# Patient Record
Sex: MALE | Race: WHITE | HISPANIC OR LATINO | Employment: FULL TIME | URBAN - METROPOLITAN AREA
[De-identification: names, ages, dates, MRNs, and addresses within clinical notes are randomized per-mention and may not be internally consistent; named-entity substitution may affect disease eponyms.]

---

## 2017-01-23 ENCOUNTER — NON-PROVIDER VISIT (OUTPATIENT)
Dept: URGENT CARE | Facility: PHYSICIAN GROUP | Age: 47
End: 2017-01-23

## 2017-01-23 DIAGNOSIS — Z11.1 SPECIAL SCREENING EXAMINATION FOR RESPIRATORY TUBERCULOSIS: ICD-10-CM

## 2017-01-23 PROCEDURE — 86580 TB INTRADERMAL TEST: CPT | Performed by: PHYSICIAN ASSISTANT

## 2017-01-25 ENCOUNTER — NON-PROVIDER VISIT (OUTPATIENT)
Dept: URGENT CARE | Facility: PHYSICIAN GROUP | Age: 47
End: 2017-01-25

## 2017-01-25 DIAGNOSIS — Z11.1 ENCOUNTER FOR PPD SKIN TEST READING: ICD-10-CM

## 2017-01-25 LAB — TB WHEAL 3D P 5 TU DIAM: NORMAL MM

## 2017-01-25 NOTE — MR AVS SNAPSHOT
Ridge PfeifferIzuiriya   2017 1:05 PM   Non-Provider Visit   MRN: [id number]    Department:  [facility]   Dept Phone:  [phone]    Description:  Male : 1970   Provider:  [facility]           Reason for Visit     PPD Reading 1 of 1      Allergies as of 2017     No Known Allergies      You were diagnosed with     Encounter for PPD skin test reading   [[id number]]         Vital Signs     Smoking Status                   Current Every Day Smoker           Basic Information     Date Of Birth Sex Race Ethnicity Preferred Language    1970 Male  or   Origin (Trinidadian,Danish,Turkmen,Dominican, etc) English      Problem List              ICD-10-CM Priority Class Noted - Resolved    Verrucous skin lesion B07.9   2014 - Present    Insomnia G47.00   2015 - Present      Health Maintenance        Date Due Completion Dates    IMM DTaP/Tdap/Td Vaccine (1 - Tdap) 10/23/1989 ---    IMM INFLUENZA (1) 2016 ---            Current Immunizations     Tuberculin Skin Test 2017      Below and/or attached are the medications your provider expects you to take. Review all of your home medications and newly ordered medications with your provider and/or pharmacist. Follow medication instructions as directed by your provider and/or pharmacist. Please keep your medication list with you and share with your provider. Update the information when medications are discontinued, doses are changed, or new medications (including over-the-counter products) are added; and carry medication information at all times in the event of emergency situations     Allergies:  No Known Allergies          Medications  Valid as of: 2017 -  1:27 PM    Generic Name Brand Name Tablet Size Instructions for use    Fluticasone Propionate (Suspension) FLONASE 50 MCG/ACT Spray 1 Spray in nose every day.        Zolpidem Tartrate (Tab) AMBIEN 10 MG Take 1 Tab by mouth at bedtime  as needed for Sleep.        .                 Medicines prescribed today were sent to:     [id number] - [place], [place] - [address] [facility]    [address] [facility] [place] [address]    Phone: [phone] Fax: [phone]    Open 24 Hours?: No      Medication refill instructions:       If your prescription bottle indicates you have medication refills left, it is not necessary to call your provider’s office. Please contact your pharmacy and they will refill your medication.    If your prescription bottle indicates you do not have any refills left, you may request refills at any time through one of the following ways: The online [employer] system (except Urgent Care), by calling your provider’s office, or by asking your pharmacy to contact your provider’s office with a refill request. Medication refills are processed only during regular business hours and may not be available until the next business day. Your provider may request additional information or to have a follow-up visit with you prior to refilling your medication.   *Please Note: Medication refills are assigned a new Rx number when refilled electronically. Your pharmacy may indicate that no refills were authorized even though a new prescription for the same medication is available at the pharmacy. Please request the medicine by name with the pharmacy before contacting your provider for a refill.           [employer] Access Code: [email or web address]  Expires: 2/22/2017 11:22 AM    [employer]  A secure, online tool to manage your health information     [employer]® is a secure, online tool that connects you to your personalized health information from the privacy of your home -- day or night - making it very easy for you to manage your healthcare. Once the activation process is completed, you can even access your medical information using the Be [employer] yanira, which is available for free in the Apple Yanira store or Google Play store.     [employer] provides  the following levels of access (as shown below):   My Chart Features   [facility] Doctor [facility]  Specialists [facility]  Urgent  Care [facility]  Primary Care  Doctor   Email your healthcare team securely and privately 24/7 X X X    Manage appointments: schedule your next appointment; view details of past/upcoming appointments X      Request prescription refills. X      View recent personal medical records, including lab and immunizations X X X X   View health record, including health history, allergies, medications X X X X   Read reports about your outpatient visits, procedures, consult and ER notes X X X X   See your discharge summary, which is a recap of your hospital and/or ER visit that includes your diagnosis, lab results, and care plan. X X       How to register for [employer]:  1. Go to  [email or web address].  2. Click on the Sign Up Now box, which takes you to the New Member Sign Up page. You will need to provide the following information:  a. Enter your [employer] Access Code exactly as it appears at the top of this page. (You will not need to use this code after you’ve completed the sign-up process. If you do not sign up before the expiration date, you must request a new code.)   b. Enter your date of birth.   c. Enter your home email address.   d. Click Submit, and follow the next screen’s instructions.  3. Create a [employer] ID. This will be your [employer] login ID and cannot be changed, so think of one that is secure and easy to remember.  4. Create a [employer] password. You can change your password at any time.  5. Enter your Password Reset Question and Answer. This can be used at a later time if you forget your password.   6. Enter your e-mail address. This allows you to receive e-mail notifications when new information is available in [employer].  7. Click Sign Up. You can now view your health information.    For assistance activating your [employer] account, call [phone]

## 2017-01-25 NOTE — PROGRESS NOTES
Ridge Candice is a 46 y.o. male here for a non-provider visit for PPD reading -- Step 1 of 1.      Resulted in Epic under original non-provider visit? No   TB evaluation questionnaire scanned into chart and original given to pt?Yes            Routed to PCP? No

## 2017-02-03 ENCOUNTER — TELEPHONE (OUTPATIENT)
Dept: URGENT CARE | Facility: PHYSICIAN GROUP | Age: 47
End: 2017-02-03

## 2017-02-03 NOTE — TELEPHONE ENCOUNTER
1. Caller Name: Ridge Harvey                                         Call Back Number: [phone] (home)       Patient approves a detailed voicemail message: yes    Pt called asking if we could fax his recent PPD results to .  I faxed it and went thru as complete.

## 2017-08-07 ENCOUNTER — HOSPITAL ENCOUNTER (OUTPATIENT)
Dept: RADIOLOGY | Facility: MEDICAL CENTER | Age: 47
End: 2017-08-07
Attending: OTOLARYNGOLOGY
Payer: COMMERCIAL

## 2017-08-07 DIAGNOSIS — R13.10 DYSPHAGIA, UNSPECIFIED TYPE: ICD-10-CM

## 2017-08-07 PROCEDURE — 700112 HCHG RX REV CODE 229: Performed by: OTOLARYNGOLOGY

## 2017-08-07 PROCEDURE — A9270 NON-COVERED ITEM OR SERVICE: HCPCS | Performed by: OTOLARYNGOLOGY

## 2017-08-07 PROCEDURE — 74220 X-RAY XM ESOPHAGUS 1CNTRST: CPT

## 2017-08-07 RX ADMIN — ANTACID/ANTIFLATULENT 1 PACKET: 380; 550; 10; 10 GRANULE, EFFERVESCENT ORAL at 13:10

## 2020-05-22 ENCOUNTER — HOSPITAL ENCOUNTER (EMERGENCY)
Facility: MEDICAL CENTER | Age: 50
End: 2020-05-22
Attending: EMERGENCY MEDICINE
Payer: COMMERCIAL

## 2020-05-22 ENCOUNTER — APPOINTMENT (OUTPATIENT)
Dept: RADIOLOGY | Facility: MEDICAL CENTER | Age: 50
End: 2020-05-22
Attending: EMERGENCY MEDICINE
Payer: COMMERCIAL

## 2020-05-22 VITALS
SYSTOLIC BLOOD PRESSURE: 150 MMHG | BODY MASS INDEX: 28.79 KG/M2 | OXYGEN SATURATION: 96 % | RESPIRATION RATE: 20 BRPM | WEIGHT: 190 LBS | HEART RATE: 81 BPM | HEIGHT: 68 IN | DIASTOLIC BLOOD PRESSURE: 113 MMHG | TEMPERATURE: 100.1 F

## 2020-05-22 DIAGNOSIS — J02.9 SORE THROAT: ICD-10-CM

## 2020-05-22 DIAGNOSIS — J06.9 UPPER RESPIRATORY TRACT INFECTION, UNSPECIFIED TYPE: ICD-10-CM

## 2020-05-22 DIAGNOSIS — Z20.822 SUSPECTED COVID-19 VIRUS INFECTION: ICD-10-CM

## 2020-05-22 DIAGNOSIS — R05.9 COUGH: ICD-10-CM

## 2020-05-22 DIAGNOSIS — R03.0 ELEVATED BLOOD PRESSURE READING: ICD-10-CM

## 2020-05-22 LAB
COVID ORDER STATUS COVID19: NORMAL
S PYO DNA SPEC NAA+PROBE: NOT DETECTED

## 2020-05-22 PROCEDURE — C9803 HOPD COVID-19 SPEC COLLECT: HCPCS | Performed by: EMERGENCY MEDICINE

## 2020-05-22 PROCEDURE — A9270 NON-COVERED ITEM OR SERVICE: HCPCS | Performed by: EMERGENCY MEDICINE

## 2020-05-22 PROCEDURE — 71045 X-RAY EXAM CHEST 1 VIEW: CPT

## 2020-05-22 PROCEDURE — 700101 HCHG RX REV CODE 250

## 2020-05-22 PROCEDURE — 99284 EMERGENCY DEPT VISIT MOD MDM: CPT

## 2020-05-22 PROCEDURE — 87651 STREP A DNA AMP PROBE: CPT

## 2020-05-22 PROCEDURE — 700102 HCHG RX REV CODE 250 W/ 637 OVERRIDE(OP): Performed by: EMERGENCY MEDICINE

## 2020-05-22 RX ORDER — ACETAMINOPHEN 500 MG
1000 TABLET ORAL ONCE
Status: DISCONTINUED | OUTPATIENT
Start: 2020-05-22 | End: 2020-05-22

## 2020-05-22 RX ORDER — PROPARACAINE HYDROCHLORIDE 5 MG/ML
SOLUTION/ DROPS OPHTHALMIC
Status: COMPLETED
Start: 2020-05-22 | End: 2020-05-22

## 2020-05-22 RX ORDER — TROPICAMIDE 10 MG/ML
SOLUTION/ DROPS OPHTHALMIC
Status: DISCONTINUED
Start: 2020-05-22 | End: 2020-05-22 | Stop reason: HOSPADM

## 2020-05-22 RX ORDER — ACETAMINOPHEN 325 MG/1
975 TABLET ORAL ONCE
Status: COMPLETED | OUTPATIENT
Start: 2020-05-22 | End: 2020-05-22

## 2020-05-22 RX ADMIN — FLUORESCEIN SODIUM: 1 STRIP OPHTHALMIC at 18:33

## 2020-05-22 RX ADMIN — PROPARACAINE HYDROCHLORIDE 1 DROP: 5 SOLUTION/ DROPS OPHTHALMIC at 18:30

## 2020-05-22 RX ADMIN — ACETAMINOPHEN 975 MG: 325 TABLET, FILM COATED ORAL at 19:04

## 2020-05-22 RX ADMIN — FLUORESCEIN SODIUM: 1 STRIP OPHTHALMIC at 18:32

## 2020-05-22 ASSESSMENT — LIFESTYLE VARIABLES: DO YOU DRINK ALCOHOL: YES

## 2020-05-23 NOTE — DISCHARGE INSTRUCTIONS
You were seen and evaluated in the Emergency Department at [facility] for:     Cough and sore throat and recent contact with someone with coronavirus    You had the following tests and studies:    Thankfully your strep test is negative and your chest x-ray is clear.  Your coronavirus test is pending, but you should act like you have it.  You do have symptoms consistent with it, but thankfully without any pneumonia on your chest x-ray and your oxygen levels are stable/normal you are safe to go home.  Please isolate and wear a mask if you are ever out in public and assume that you do have it until we get test results back.    You received the following medications:    Acetaminophen for aches and pains and fever.    You received the following prescriptions:    Take acetaminophen/Tylenol 1000 mg up to 3 times per day for the next 3 days.  ----------------------------    Please make sure to follow up with:    Your primary care provider, your blood pressure was elevated today, but if you have any new or worsening symptoms particularly trouble breathing or feeling like you might pass out or any other concerns return to the ER immediately.    Good luck, we hope you get better soon!  ----------------------------    We always encourage patients to return IMMEDIATELY if they have:  Increased or changing pain, passing out, fevers over 100.4 (taken in your mouth or rectally) for more than 2 days, redness or swelling of skin or tissues, feeling like your heart is beating fast, chest pain that is new or worsening, trouble breathing, feeling like your throat is closing up and can not breath, inability to walk, weakness of any part of your body, new dizziness, severe bleeding that won't stop from any part of your body, if you can't eat or drink, or if you have any other concerns.   If you feel worse, please know that you can always return with any questions, concerns, worse symptoms, or you are feeling unsafe. We  certainly cannot say for sure that we have ruled out every illness or dangerous disease, but we feel that at this specific time, your exam, tests, and vital signs like heart rate and blood pressure are safe for discharge.     Usted fue visto y evaluado en el Departamento de Emergencia del [facility] por:    Tos y dolor de garganta y contacto reciente con alguien con coronavirus    Usted tuvo las siguientes pruebas y estudios:    Afortunadamente, mcbride prueba de estreptococo es negativa y mcbride radiografía de tórax es yury. Mcbride prueba de coronavirus está pendiente, tay debe actuar samantha si la tuviera. Sí tiene síntomas consistentes, tay afortunadamente sin neumonía en la radiografía de tórax y trey niveles de oxígeno son estables / normales, es seguro regresar a mcbride hogar. Aísle y use katherin máscara si alguna vez sale al público y asuma que la tiene hasta que obtengamos los resultados de la prueba.    Recibió los siguientes medicamentos:    Acetaminofeno para giovanna y fiebre.    Recibió las siguientes recetas:    [place] paracetamol / Tylenol 1000 mg hasta 3 veces por día brown los próximos 3 días.  ----------------------------    Asegúrate de seguir con:    Mcbride proveedor de atención primaria, mcbride presión arterial se elevó hoy, tay si tiene algún síntoma nuevo o que empeora, particularmente problemas para respirar o sensación de desmayo o cualquier otra inquietud, regrese a la jessica de emergencias de inmediato.    ¡Raritan suerte, esperamos que te mejores pronto!  ----------------------------    Siempre alentamos a los pacientes a regresar INMEDIATAMENTE si tienen:  Aumento o cambio del dolor, desmayo, fiebre de más de 100.4 (tomada en la boca o por vía rectal) brown más de 2 días, enrojecimiento o hinchazón de la piel o los tejidos, sensación de que mcbride corazón late rápidamente, dolor en el pecho nuevo o que empeora, problemas respiración, sensación de que mcbride garganta se está cerrando y no puede respirar, incapacidad  para caminar, debilidad en cualquier parte de mcbride cuerpo, nuevos mareos, sangrado intenso que no se detendrá en ninguna parte de mcbride cuerpo, si no puede comer o sajan, o si tiene alguna otra inquietud.  Si se siente peor, sepa que siempre puede regresar con cualquier pregunta, inquietud, síntomas peores o si se siente inseguro. Ciertamente, no podemos decir con certeza que hemos descartado todas las enfermedades o enfermedades peligrosas, tay creemos que en flor momento específico, mcbride examen, pruebas y signos vitales samantha la frecuencia cardíaca y la presión arterial son seguros para el tatiana.

## 2020-05-23 NOTE — ED PROVIDER NOTES
ED Provider Note    CHIEF COMPLAINT  Chief Complaint   Patient presents with   • Sore Throat     Onset X 3 days   • Cough     Minor   • Burning Eyes     Irritation       HPI    Primary care provider: Shiela Raphael M.D.  Means of arrival: POV  History obtained from: Patient  History limited by: Nothing    Ridge Florentino is a 49 y.o. male who presents with sore throat, cough, and eye irritation.  Symptoms began 3 days ago.  On the 17th of this month the patient was in close contact with his brother, who was soon after that diagnosed with coronavirus.  The patient is concerned he has coronavirus.  He does not have any chest pain or dyspnea, no abdominal pain or vomiting or fevers or chills.  He has occasionally taken Tylenol PM which gives moderate relief of his symptoms.  He denies any chronic medical history he has had elevated blood pressures in the past but does not take any daily medications.  No aggravating factors.  No productive cough or hemoptysis.  No leg swelling.  No other sick contacts other than his brother.  The patient is quite nervous and requests coronavirus testing he does not want to go back to work or expose his family until he has test results.  He does not have any discharge from his eyes or vision changes or vision loss.  No significant eye pain just a mild irritation to both of his eyes.  He has a mild achy sore throat that is worsened when he swallows.  No difficulty swallowing.    REVIEW OF SYSTEMS  Constitutional: Negative for fever or chills.   HENT: Negative for rhinorrhea but positive for sore throat.    Eyes: Positive for eye irritation negative for vision loss.  Respiratory: Positive for cough negative for dyspnea.  Cardiovascular: Negative for chest pain or palpitations.   Gastrointestinal: Negative for nausea, vomiting, or abdominal pain.   Genitourinary: Negative for dysuria or flank pain.   Musculoskeletal: Negative for back pain or joint pain.   Skin: Negative for  "itching or rash.   Neurological: Negative for sensory or motor changes.   See HPI for further details. All other systems are negative.     PAST MEDICAL HISTORY   has a past medical history of Verrucous skin lesion (9/20/2014).    PAST FAMILY HISTORY  Family History   Problem Relation Age of Onset   • Diabetes Mother    • Cancer Mother         stomach   • Cancer Father         prostate   • Heart Disease Neg Hx    • Hypertension Neg Hx    • Hyperlipidemia Neg Hx    • Stroke Neg Hx        SOCIAL HISTORY  Social History     Tobacco Use   • Smoking status: Former Smoker     Last attempt to quit: 2/28/2010     Years since quitting: 10.2   • Smokeless tobacco: Never Used   • Tobacco comment: cessation recommended   Substance and Sexual Activity   • Alcohol use: Yes     Alcohol/week: 0.0 oz     Comment: weekend   • Drug use: No   • Sexual activity: Yes       SURGICAL HISTORY   has a past surgical history that includes appendectomy.    CURRENT MEDICATIONS  No daily medications.    ALLERGIES  No Known Allergies    PHYSICAL EXAM  VITAL SIGNS: /113   Pulse 81   Temp 37.8 °C (100.1 °F) (Oral)   Resp 20   Ht 1.727 m (5' 8\")   Wt 86.2 kg (190 lb)   SpO2 96%   BMI 28.89 kg/m²    Pulse ox interpretation: On room air, I interpret this pulse ox as normal.  Constitutional: Well-developed, well-nourished. Sitting up.   HEENT: Normocephalic, atraumatic. Posterior pharynx clear, mucous membranes moist.  Eyes:  EOMI. Normal sclerae.  No injection.  No discharge.  On staining there is no corneal abrasion or ulcer, right eye pressure 18 left eye pressure 19.  Neck: Supple, nontender.  Chest/Pulmonary: Clear to ausculation bilaterally, no wheezes or rhonchi.  Cardiovascular: Regular rate and rhythm, no murmur.   Abdomen: Soft, nontender; no rebound, guarding, or masses.  Back: No CVA or midline tenderness.   Musculoskeletal: No deformity or edema.  Neuro: Clear speech, normal coordination, cranial nerves II-XII grossly intact, no " focal asymmetry or sensory deficits.   Psych: Normal mood and affect.  Skin: No rashes, warm and dry.      DIAGNOSTIC STUDIES / PROCEDURES    LABS & EKG  Results for orders placed or performed during the hospital encounter of 05/22/20   Group A Strep by PCR    Specimen: Throat   Result Value Ref Range    Group A Strep by PCR Not Detected Not Detected   COVID/SARS CoV-2    Specimen: Nasopharyngeal; Respirate   Result Value Ref Range    COVID Order Status Received        RADIOLOGY  DX-CHEST-PORTABLE (1 VIEW)   Final Result      No acute cardiac or pulmonary abnormalities are identified.          COURSE & MEDICAL DECISION MAKING    This is a 49 y.o. male who presents with sore throat and cough, possible coronavirus contact.  Stable vital signs.  No dyspnea.    Differential Diagnosis includes but is not limited to:  URI, strep throat, coronavirus, pneumonia    ED Course:  This is a pleasant otherwise healthy 49-year-old male presenting with upper respiratory illness, positive coronavirus contact in his brother.  Stable vital signs here plan to screen with strep test, chest x-ray, and outpatient/send out coronavirus testing.    Thankfully strep test is negative and chest x-ray is clear.  No hypoxia here.  Feeling better with Tylenol.  Vital signs are stable, had a long discussion with the patient that there is a high possibility that he could have coronavirus but thankfully even if he was positive his symptoms are mild and his vital signs are stable so he may be treated conservatively outpatient.  No indication for hospitalization or supplemental oxygen.  However, the patient understands that if his symptoms worsen he will return to the ER immediately.  He is already wearing a mask, and he will continue to do so and self isolate until test results are back.  Blood pressure slightly elevated today, he will follow-up with his PCP to have this rechecked.  No obvious evidence of conjunctivitis to his eyes.    Medications    FLUORESCEIN SODIUM 1 MG OP STRP (  Given 5/22/20 1833)   FLUORESCEIN SODIUM 1 MG OP STRP (  Given 5/22/20 1832)   PROPARACAINE HCL 0.5 % OP SOLN (1 Drop  Given 5/22/20 1830)   acetaminophen (TYLENOL) tablet 975 mg (975 mg Oral Given 5/22/20 1904)     FINAL IMPRESSION  1. Upper respiratory tract infection, unspecified type    2. Cough    3. Sore throat    4. Suspected COVID-19 virus infection    5. Elevated blood pressure reading        PRESCRIPTIONS  Discharge Medication List as of 5/22/2020  7:53 PM          FOLLOW UP  Shiela Raphael M.D.  [address] [id number] [phone]    Schedule an appointment as soon as possible for a visit in 3 days      [facility], Emergency Dept  [address] [facility] [address] [phone]  Today  If you have ANY new or worse symptoms!        -DISCHARGE-     Results, exam findings, clinical impression, presumed diagnosis, treatment options, and strict return precautions were discussed with the patient, and they verbalized understanding, agreed with, and appreciated the plan of care.    Pertinent Labs & Imaging studies reviewed and verified by myself, as well as nursing notes and the patient's past medical, family, and social histories (See chart for details).    The patient is referred to his primary physician for blood pressure management, diabetic screening, and for all other preventative health concerns.     Portions of this record were made with voice recognition software.  Despite my review, spelling/grammar/context errors may still remain.  Interpretation of this chart should be taken in this context.    Electronically signed by Kadeem Franco M.D. on 5/22/2020 at 11:21 PM.

## 2020-05-23 NOTE — ED NOTES
All lines and monitors disconnected.  Discharge instructions reviewed, questions answered.  Pt ambulates to the tent exit, steady gait, escorted by RN.  Pt states all belongings in possession.

## 2020-05-23 NOTE — ED TRIAGE NOTES
.  Chief Complaint   Patient presents with   • Sore Throat     Onset X 3 days   • Cough     Minor   • Burning Eyes     Irritation   No difficulty breathing.  + minor dysphagia.   + productive cough.  OU irritation X approximately 2 - 3 weeks.  + OU injection noted.    Pt denies any recent travel.   Pt requests COVID test - Pt reports that his brother is COVID positive.     Pt was instructed to notify staff for any worsening symptoms.

## 2020-05-25 LAB
SARS-COV-2 RNA RESP QL NAA+PROBE: DETECTED
SPECIMEN SOURCE: ABNORMAL

## 2020-05-26 NOTE — ED NOTES
COVID-19 Test Follow-Up  05/26/20 5/22/2020 18:43   2019-nCoV RNA Interp DETECTED (A)   2019-nCoV Source NP Swab     Patient is positive for COVID-19.    I have informed the patient of the positive result by phone and that the Health Dept would be in contact soon. Instructed them to continue to quarantine and self-isolate according with the CDC guidance or as otherwise directed by the Health Dept.    Per the CDC (non-test-based strategy) should continue to quarantine until: At least 3 days (72 hours) have passed since recovery defined as resolution of fever without the use of fever-reducing medications and improvement in respiratory symptoms (e.g., cough, shortness of breath); and, At least 10 days have passed since symptoms first appeared.  He states he is feeling well. Has no symptoms. He is advised to return to the ER for worsening symptoms including difficulty breathing, ongoing fever, weakness or chest pain.    Carlee Gustafson, PharmD

## 2020-05-27 ENCOUNTER — TELEPHONE (OUTPATIENT)
Dept: EMERGENCY MEDICINE | Facility: MEDICAL CENTER | Age: 50
End: 2020-05-27

## 2020-05-27 NOTE — TELEPHONE ENCOUNTER
Reached pt on first attempt at number listed.    Pts symptoms are improving. He continues to have chills.    Pt did not have any questions about their discharge instructions, they read and understood the instructions given to them during their discharge from the [facility] ED.    Pt did not have any questions about the CDC self-isolation guidelines. Reviewed the below with the patient and they stated they were following these guidelines  a. Staying home and frequently washing your hands, and disinfecting commonly used household items (such as cellphone, remote, door handles, tabletops, faucets, etc.)  b. You are wearing a mask or some sort of effective personal protection equipment (I can provide resources for them if needed), as well as covering your cough and avoiding sharing household items.   c. You are staying in your own room; besides caretaker coming and going to help-out, you are sleeping in your own space away from others.    Reminded pt that we are available if any questions arise; they should return to [facility] ED if their sx worsen; and call 911 if they have a medical emergency.

## 2021-03-20 ENCOUNTER — APPOINTMENT (OUTPATIENT)
Dept: RADIOLOGY | Facility: MEDICAL CENTER | Age: 51
End: 2021-03-20
Attending: EMERGENCY MEDICINE
Payer: COMMERCIAL

## 2021-03-20 ENCOUNTER — HOSPITAL ENCOUNTER (EMERGENCY)
Facility: MEDICAL CENTER | Age: 51
End: 2021-03-20
Attending: EMERGENCY MEDICINE
Payer: COMMERCIAL

## 2021-03-20 VITALS
OXYGEN SATURATION: 98 % | WEIGHT: 190 LBS | BODY MASS INDEX: 28.79 KG/M2 | DIASTOLIC BLOOD PRESSURE: 78 MMHG | HEART RATE: 70 BPM | HEIGHT: 68 IN | TEMPERATURE: 97.7 F | SYSTOLIC BLOOD PRESSURE: 130 MMHG | RESPIRATION RATE: 20 BRPM

## 2021-03-20 DIAGNOSIS — S39.012A STRAIN OF LUMBAR REGION, INITIAL ENCOUNTER: ICD-10-CM
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
## 2021-03-20 DIAGNOSIS — S80.12XA CONTUSION OF LEFT LOWER EXTREMITY, INITIAL ENCOUNTER: ICD-10-CM

## 2021-03-20 DIAGNOSIS — S29.012A UPPER BACK STRAIN, INITIAL ENCOUNTER: ICD-10-CM

## 2021-03-20 DIAGNOSIS — S16.1XXA STRAIN OF NECK MUSCLE, INITIAL ENCOUNTER: ICD-10-CM

## 2021-03-20 PROCEDURE — 72125 CT NECK SPINE W/O DYE: CPT

## 2021-03-20 PROCEDURE — 72100 X-RAY EXAM L-S SPINE 2/3 VWS: CPT

## 2021-03-20 PROCEDURE — 72070 X-RAY EXAM THORAC SPINE 2VWS: CPT

## 2021-03-20 PROCEDURE — 71045 X-RAY EXAM CHEST 1 VIEW: CPT

## 2021-03-20 PROCEDURE — 73552 X-RAY EXAM OF FEMUR 2/>: CPT | Mod: LT

## 2021-03-20 PROCEDURE — 73590 X-RAY EXAM OF LOWER LEG: CPT | Mod: LT

## 2021-03-20 PROCEDURE — 99284 EMERGENCY DEPT VISIT MOD MDM: CPT

## 2021-03-20 PROCEDURE — 305948 HCHG GREEN TRAUMA ACT PRE-NOTIFY NO CC
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
## 2021-03-20 NOTE — ED NOTES
Per ERP pt can sit up. Removed collar @ this time. Per ERP pt can get dressed.Educated pt to stay for rest of xray results. Pt dressed et sitting in chair states he will wait for images to come back

## 2021-04-13 NOTE — INFECTION CONTROL
This patient is considered COVID RECOVERED.    Patient initially tested positive for COVID on 5/22/2020.  Patient is greater than or equal to 21 days from symptom onset and/or positive test, with symptom improvement.  Per the CDC guidance, this patient no longer requires transmission based precautions.  Patient may be placed on any unit per the bed assignment policy, including placement in a semi-private room with a roommate.